# Patient Record
Sex: FEMALE | Race: WHITE | NOT HISPANIC OR LATINO | Employment: UNEMPLOYED | URBAN - METROPOLITAN AREA
[De-identification: names, ages, dates, MRNs, and addresses within clinical notes are randomized per-mention and may not be internally consistent; named-entity substitution may affect disease eponyms.]

---

## 2020-06-09 ENCOUNTER — OFFICE VISIT (OUTPATIENT)
Dept: FAMILY MEDICINE | Facility: HOSPITAL | Age: 40
End: 2020-06-09
Payer: MEDICAID

## 2020-06-09 VITALS
HEART RATE: 55 BPM | BODY MASS INDEX: 26.12 KG/M2 | SYSTOLIC BLOOD PRESSURE: 103 MMHG | HEIGHT: 64 IN | WEIGHT: 153 LBS | DIASTOLIC BLOOD PRESSURE: 67 MMHG

## 2020-06-09 DIAGNOSIS — G56.03 BILATERAL CARPAL TUNNEL SYNDROME: ICD-10-CM

## 2020-06-09 DIAGNOSIS — Z00.00 HEALTHCARE MAINTENANCE: Primary | ICD-10-CM

## 2020-06-09 DIAGNOSIS — B35.3 TINEA PEDIS OF BOTH FEET: ICD-10-CM

## 2020-06-09 PROCEDURE — 99203 OFFICE O/P NEW LOW 30 MIN: CPT | Performed by: STUDENT IN AN ORGANIZED HEALTH CARE EDUCATION/TRAINING PROGRAM

## 2020-06-09 RX ORDER — KETOCONAZOLE 20 MG/G
CREAM TOPICAL DAILY
Qty: 30 G | Refills: 1 | Status: SHIPPED | OUTPATIENT
Start: 2020-06-09

## 2020-06-09 NOTE — PROGRESS NOTES
"Subjective:       Patient ID: Sherrie Mayer is a 39 y.o. female.    Chief Complaint: Annual Exam    HPI     Patient presents to clinic to establish care. Has not seen a PCP in about 5 years.     Patient has had wrist and hand numbness and tingling on her R side that she is concerned may be carpal tunnel. Her work involved repetitive hand movements. She bought a splint from [employer] and wears at night that helps. She has not had as much of an issue since she has stopped working due to covid-19.     The patient has also noticed a new pink mole on her L anterior thigh that has not changed since it has appeared. She also has had foot fungus bilaterally that she has tried treating with tea tree oil without success.     Patient has not had any recent illnesses. Denies tobacco use. Drinks about a 12-pack of beer over a week. She has a history of marijuana use and currently "eats" it. Patient reports that she smoked marijuana from [age] years of age. Patient says that while she smoked marijuana she would frequently get bronchitis, pneumonia, and sinus infections. Since she has stopped smoking she has only had one case of pneumonia in 5 years.     Past Medical History  -none    Surgical History  - pilonidal cyst removal   - wisdom teeth removal    Social History  -works as an artist   -tobacco use: denies  -alcohol use: 1-3 beers a day  -drug use: history of smoking marijuana from [age] years of age. Currently consumes marijuana in an edible form.       Review of Systems   Constitutional: Negative for activity change, appetite change, fatigue, fever and unexpected weight change.   HENT: Negative for congestion, hearing loss, postnasal drip, rhinorrhea, sinus pain and sore throat.    Eyes: Negative for photophobia and visual disturbance.   Respiratory: Negative for apnea, cough, choking, chest tightness, shortness of breath and wheezing.    Cardiovascular: Negative for chest pain, palpitations and leg swelling. "   Gastrointestinal: Negative for abdominal distention, abdominal pain, constipation, diarrhea, nausea and vomiting.   Endocrine: Negative for cold intolerance, heat intolerance and polyuria.   Genitourinary: Negative for difficulty urinating, flank pain, frequency and urgency.   Musculoskeletal: Negative for arthralgias, back pain and myalgias.   Skin: Negative for rash.   Neurological: Positive for numbness. Negative for dizziness, weakness, light-headedness and headaches.        Numbness and tingling of right wrist and hand       Objective:      Vitals:    06/09/20 1343   BP: 103/67   Pulse: (!) 55     Physical Exam   Constitutional: She is oriented to person, place, and time. She appears well-developed and well-nourished. No distress.   HENT:   Head: Normocephalic and atraumatic.   Mouth/Throat: Oropharynx is clear and moist. No oropharyngeal exudate.   Eyes: Pupils are equal, round, and reactive to light. Conjunctivae and EOM are normal. No scleral icterus.   Neck: Normal range of motion. Neck supple. No thyromegaly present.   Cardiovascular: Normal rate, regular rhythm, normal heart sounds and intact distal pulses. Exam reveals no gallop and no friction rub.   No murmur heard.  Pulmonary/Chest: Effort normal and breath sounds normal. No respiratory distress. She has no wheezes. She exhibits no tenderness.   Abdominal: Soft. Bowel sounds are normal. She exhibits no distension and no mass. There is no tenderness. No hernia.   Musculoskeletal: Normal range of motion. She exhibits no edema, tenderness or deformity.   Lymphadenopathy:     She has no cervical adenopathy.   Neurological: She is alert and oriented to person, place, and time. No cranial nerve deficit.   Skin: Skin is warm and dry. Capillary refill takes less than 2 seconds. She is not diaphoretic. No erythema.   Psychiatric: She has a normal mood and affect. Her behavior is normal.   Nursing note and vitals reviewed.      Assessment:     Sherrie Yun  is a 39 y.o. Female presenting to clinic to establish care. Patient's only complaints are wrist numbness and tingling, a new nevus that has appeared within the past 1.5 years, and tinea pedis.   1. Healthcare maintenance    2. Tinea pedis of both feet    3. Bilateral carpal tunnel syndrome        Plan:       Healthcare maintenance  -     CBC auto differential; Future; Expected date: 06/09/2020  -     Basic metabolic panel; Future; Expected date: 06/09/2020  -     Lipid Panel; Future; Expected date: 06/09/2020  - Pap smear completed at this visit as well as STD screening   - Plan to call patient when results are obtained     Tinea pedis of both feet  -     ketoconazole (NIZORAL) 2 % cream; Apply topically once daily.  Dispense: 30 g; Refill: 1  - Apply to affected area daily for 6 weeks   - May need more PO therapy if topicals fail     Bilateral carpal tunnel syndrome  -     Ambulatory referral/consult to Physical/Occupational Therapy; Future; Expected date: 06/16/2020  - Handout on wrist exercises given to patient   - Continue wrist splints   - RTC if symptoms worsen or fail to improve       Follow up in about 3 months (around 9/9/2020), or if symptoms worsen or fail to improve.

## 2020-06-12 ENCOUNTER — CLINICAL SUPPORT (OUTPATIENT)
Dept: REHABILITATION | Facility: HOSPITAL | Age: 40
End: 2020-06-12
Payer: MEDICAID

## 2020-06-12 DIAGNOSIS — M79.641 RIGHT HAND PAIN: ICD-10-CM

## 2020-06-12 DIAGNOSIS — G56.03 BILATERAL CARPAL TUNNEL SYNDROME: ICD-10-CM

## 2020-06-12 PROCEDURE — 97530 THERAPEUTIC ACTIVITIES: CPT | Mod: PO

## 2020-06-12 PROCEDURE — 97166 OT EVAL MOD COMPLEX 45 MIN: CPT | Mod: PO

## 2020-06-12 NOTE — PATIENT INSTRUCTIONS
Extension (Active With Finger Extension)        With forearm on table and wrist over edge, lift hand with fingers straight.   Repeat 20 times. Do 3 sessions per day.    Radial / Ulnar Deviation (Assistive)        Move hand side to side like a windshield wiper. Do not move elbow.  Repeat 20 times. Do 3 sessions per day.    Circumduction (Active)        With fingers curled, move slowly at wrist in clock- wise circles 20 times. Repeat counterclockwise. Do not move elbow or shoulder.  Do 3 sessions per day.      Tendon Glides         Start at position A and move through each position slowly attempting to achieve full glide.  A-E is ONE repetition.     Complete 10 reps 3 times per day.       Heat prior to exercising 10-15 minutes

## 2020-06-12 NOTE — PLAN OF CARE
[facility] Occupational Therapy  Initial Evaluation     Date: 6/12/2020  Patient: Sherrie Mayer  Chart Number: [id number]  Referring Physician: Anita Glez MD  Therapy Diagnosis:   1. Bilateral carpal tunnel syndrome  Ambulatory referral/consult to Physical/Occupational Therapy   2. 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 Pt denies any involvement in the LUE. Pt has been wearing a cockup splint at night and while typing w/ some relief of pain. Overall symptoms have improved since pt has not been working.  Imaging: N/A  Previous Therapy: None    Patient's Goals for Therapy: Normalize right hand function.    Pain:  Functional Pain Scale Rating 0-10:   2/10 on average  2/10 at best  6/10 at worst  Location: right wrist  Description: Aching and Throbbing  Aggravating Factors: moving the wrist  Easing Factors: relaxation    Previous Level of function Independent w/ ADL's, work    Current Level of Function Difficulty w/ typing, opening containers    Occupation:  Seamstress, crafts  Working presently: unemployed  Duties: Creating costumes, decorating movie sets, floats    Past Medical History/Physical Systems Review:   Sherrie Mayer  has no past medical history on file.    Sherrie Mayer  has no past surgical history on file.    Sherrie has a current medication list which includes the  following prescription(s): ketoconazole.    Review of patient's allergies indicates:   Allergen Reactions    Hydrocodone Nausea And Vomiting          Objective     Mental status: alert    Observation:   Slight intrinsic minus     Sensation: Ulnar Nerve  Distribution   2020    Left   Monofilament Testing    Normal 1.65-2.83 x   Diminished Light Touch 3.22-3.61    Diminished Protective 3.84-4.31    Loss of Protective 4.56-6.65    Untestable >6.65      Range of Motion:   Right  WNL all joint planes    Special Tests:   Right   2020   Thumb CMC Grind Test    Finkelstein's Test    Phalen's Test -   Tinel's Test at volar wrist, cubital tunnel -   Anil's Test    Extrinsic Tightness Test    Intrinsic Tightness Test    Oquendo's Shift     ORL Test    Froment's Sign    Analy's Sign     Piano Key Test     ECU Synergy     TFCC Load      Manual Muscle Testin/5 intrinsics     Strength: (SILVINA Dynamometer in psi.)      2020    Left Right   Rung II 55 56       Pinch Strength (Measured in psi)     2020    Left Right   Key Pinch 11  15    3pt Pinch 13  10        Treatment     Treatment Time In: 4:00 PM  Treatment Time Out: 4:15 PM  Total Treatment time separate from Evaluation time:15 min    Sherrie participated in dynamic functional therapeutic activities to improve functional performance for 15  minutes, including:  -AROM WE/WF/UD/RD, circles  -TGE's    Home Exercise Program/Education:  Issued HEP (see patient instructions in EMR) and educated on modality use for pain management . Exercises were reviewed and Sherrie was able to demonstrate them prior to the end of the session.   Pt received a written copy of exercises to perform at home. Sherrie demonstrated good  understanding of the education provided.  Pt was advised to perform these exercises free of pain, and to stop performing them if pain occurs.    Patient/Family Education: role of OT, goals for OT, scheduling/cancellations - pt  verbalized understanding. Discussed insurance limitations with patient.    Additional Education provided: Pathophysiology      Assessment     Sherrie Mayer is a 39 y.o. female presents with limitations as described in problem list. Patient can benefit from Occupational Therapy services for Iontophoresis, ultrasound, moist heat, therapeutic exercises, home exercise program provied with written instructions, ice and strengthening and orthotics, if deemed necessary . The following goals were discussed with the patient and she is in agreement with them as to be addressed in the treatment plan.    The patient's rehab potential is Good.     Anticipated barriers to occupational therapy: None  Pt has no cultural, educational or language barriers to learning provided.    Profile and History Assessment of Occupational Performance Level of Clinical Decision Making Complexity Score   Occupational Profile:   Sherrie Mayer is a 39 y.o. female who is not employed Sherrie Mayer has difficulty with  ADLs and IADLs as listed previously, which  affecting his/her daily functional abilities.      Comorbidities:    has no past medical history on file.    Medical and Therapy History Review:   Expanded               Performance Deficits    Physical:   Strength  Pinch Strength    Cognitive:  No Deficits    Psychosocial:    No Deficits     Clinical Decision Making:  moderate    Assessment Process:  Problem-Focused Assessments    Modification/Need for Assistance:  Not Necessary    Intervention Selection:  Several Treatment Options       moderate  Based on PMHX, co morbidities , data from assessments and functional level of assistance required with task and clinical presentation directly impacting function.         Goals:    LTG's (12 weeks):  1)   Pt will report significant decrease in numbness of the right hand.  2)   Decrease complaints of pain to  2 out of 10 at worst to increase functional hand use for ADL/work/leisure  activities.  3)   Pt will return to near to prior level of function for ADLs and household management reporting I or Mod I with ADLs (dressing, feeding, grooming, toileting).     STG's (6 weeks)  1)   Patient to be IND with HEP and modalities for pain/edema managment.  2)   Patient to be IND wiht Orthotic use, wear and care precautions.   3)   Decrease complaints of pain to  4 out of 10 at worst to increase functional hand use for ADL/work/leisure activities.          Plan     Pt to be treated by Occupational Therapy 2 times per week for 12 weeks during the certification period from 6/12/2020 to 9/12/2020 to achieve the established goals.     Treatment to include: Paraffin, Fluidotherapy, Manual therapy/joint mobilizations, US 3 mhz and Therapeutic exercises/activities., as well as any other treatments deemed necessary based on the patient's needs or progress.     JALYN Bragg OTR/L, CHT  Occupational therapist, Certified Hand Therapist

## 2020-06-16 NOTE — PROGRESS NOTES
I assume primary medical responsibility for this patient. I have reviewed the history, physical, and assessement & treatment plan with the resident and agree that the care is reasonable and necessary. This service has been performed by a resident without the presence of a teaching physician under the primary care exception. If necessary, an addendum of additional findings or evaluation beyond the resident documentation will be noted below.    Magali Cifuentes MD

## 2020-06-19 ENCOUNTER — CLINICAL SUPPORT (OUTPATIENT)
Dept: REHABILITATION | Facility: HOSPITAL | Age: 40
End: 2020-06-19
Payer: MEDICAID

## 2020-06-19 DIAGNOSIS — M79.641 RIGHT HAND PAIN: ICD-10-CM

## 2020-06-19 PROCEDURE — 97022 WHIRLPOOL THERAPY: CPT | Mod: PO

## 2020-06-19 PROCEDURE — 97110 THERAPEUTIC EXERCISES: CPT | Mod: PO

## 2020-06-19 NOTE — PROGRESS NOTES
Occupational Therapy Daily Treatment Note     Date: 6/19/2020  Name: Sherrie Mayer  Clinic Number: [id number]    Therapy Diagnosis:   Encounter Diagnosis   Name Primary?    Right hand pain      Physician: Anita Glez MD    Medical Diagnosis: Bilateral CTS  Physician Orders: Eval/tx  Evaluation Date: 6/12/2020  Plan of Care Certification Date: 9/11/2020  Authorization Period: 12/31/2020  Surgery Date and Procedure: N/A  Date of Return to MD: SHARONA     Visit #: 2 of 20  Time In: 3:30 PM  Time Out: 4:15 PM  Total Billable Time: 25 min     Precautions: Standard    Subjective     Pt reports: Feeling better  Response to previous treatment:John well    Pain: 2/10  Location: Right wrist    Objective     Sherrie received the following supervised modalities after being cleared for contradictions for 15 minutes:   -Fluidotherapy    Sherrie performed therapeutic exercises for 25 minutes including:  -AROM WE/WF/UD/RD, circles 20  -Isospheres 3'  -Juxacisor 3'  -Coins 1 container    Home Exercises and Education Provided     Education provided:   - Progress towards goals     Written Home Exercises Provided: Patient instructed to cont prior HEP.  Exercises were reviewed and Sherrie was able to demonstrate them prior to the end of the session.  Sherrie demonstrated good  understanding of the HEP provided.   .   See EMR under Patient Instructions for exercises provided prior visit.        Assessment     Pt would continue to benefit from skilled OT to maximize RUE function.     Sherrie is progressing well towards her goals and there are no updates to goals at this time. Pt prognosis is Good.     Pt will continue to benefit from skilled outpatient occupational therapy to address the deficits listed in the problem list on initial evaluation provide pt/family education and to maximize pt's level of independence in the home and community environment.       Pt's spiritual, cultural and educational needs considered and pt agreeable to plan of  care and goals.    Goals:  LTG's (12 weeks):  1)   Pt will report significant decrease in numbness of the right hand.  2)   Decrease complaints of pain to  2 out of 10 at worst to increase functional hand use for ADL/work/leisure activities.  3)   Pt will return to near to prior level of function for ADLs and household management reporting I or Mod I with ADLs (dressing, feeding, grooming, toileting).      STG's (6 weeks)  1)   Patient to be IND with HEP and modalities for pain/edema managment.  2)   Patient to be IND wiht Orthotic use, wear and care precautions.   3)   Decrease complaints of pain to  4 out of 10 at worst to increase functional hand use for ADL/work/leisure activities.    Plan   Cont OT to address above goals.        JALYN Bragg OTR/L, CHT

## 2020-06-22 ENCOUNTER — CLINICAL SUPPORT (OUTPATIENT)
Dept: REHABILITATION | Facility: HOSPITAL | Age: 40
End: 2020-06-22
Payer: MEDICAID

## 2020-06-22 DIAGNOSIS — M79.641 RIGHT HAND PAIN: ICD-10-CM

## 2020-06-22 PROCEDURE — 97022 WHIRLPOOL THERAPY: CPT | Mod: PO

## 2020-06-22 PROCEDURE — 97530 THERAPEUTIC ACTIVITIES: CPT | Mod: PO

## 2020-06-22 NOTE — PROGRESS NOTES
Occupational Therapy Daily Treatment Note     Date: 6/22/2020  Name: Sherrie Mayer  Clinic Number: [id number]    Therapy Diagnosis:   Encounter Diagnosis   Name Primary?    Right hand pain      Physician: Anita Glez MD  Medical Diagnosis: Bilateral CTS  Physician Orders: Eval/tx  Evaluation Date: 6/12/2020  Plan of Care Certification Date: 9/11/2020  Authorization Period: 12/31/2020  Surgery Date and Procedure: N/A  Date of Return to MD: SHARONA     Visit #: 3 of 20  Time In: 10:30 AM  Time Out: 11:15 AM  Total Billable Time: 25 min     Precautions: Standard    Subjective     Pt reports: No new c/o, incidents of numbness decreasing in frequency and intensity  Response to previous treatment:John well, no issues w/ HEP    Pain: 0/10    Objective   Sherrie received the following supervised modalities after being cleared for contradictions for 15 minutes:   -Fluidotherapy     Sherrie performed therapeutic activities for 25 minutes including:  -AROM WE/WF/UD/RD, circles 20  -Isospheres 3'  -Juxacisor 3'  -Coins 1 container  -Rice bucket 3'  -Small pom poms 2 containers blue CP  Home Exercises and Education Provided     Education provided:   - Progress towards goals     Written Home Exercises Provided: Patient instructed to cont prior HEP.  Exercises were reviewed and Sherrie was able to demonstrate them prior to the end of the session.  Sherrie demonstrated good  understanding of the HEP provided.   .   See EMR under Patient Instructions for exercises provided prior visit.        Assessment     Pt would continue to benefit from skilled OT to maximize RUE function.     Sherrie is progressing well towards her goals and there are no updates to goals at this time. Pt prognosis is Good.     Pt will continue to benefit from skilled outpatient occupational therapy to address the deficits listed in the problem list on initial evaluation provide pt/family education and to maximize pt's level of independence in the home and community  environment.       Pt's spiritual, cultural and educational needs considered and pt agreeable to plan of care and goals.    Goals:  LTG's (12 weeks):  1)   Pt will report significant decrease in numbness of the right hand. Progressing  2)   Decrease complaints of pain to  2 out of 10 at worst to increase functional hand use for ADL/work/leisure activities. Progressing  3)   Pt will return to near to prior level of function for ADLs and household management reporting I or Mod I with ADLs (dressing, feeding, grooming, toileting). Progressing     STG's (6 weeks)  1)   Patient to be IND with HEP and modalities for pain/edema managment. Progressing  2)   Patient to be IND wiht Orthotic use, wear and care precautions. Progressing  3)   Decrease complaints of pain to  4 out of 10 at worst to increase functional hand use for ADL/work/leisure activities. Progressing    Plan   Cont OT to address above goals.        JALYN Bragg OTR/L, CHT

## 2020-06-24 ENCOUNTER — CLINICAL SUPPORT (OUTPATIENT)
Dept: REHABILITATION | Facility: HOSPITAL | Age: 40
End: 2020-06-24
Payer: MEDICAID

## 2020-06-24 DIAGNOSIS — M79.641 RIGHT HAND PAIN: ICD-10-CM

## 2020-06-24 PROCEDURE — 97022 WHIRLPOOL THERAPY: CPT | Mod: PO

## 2020-06-24 PROCEDURE — 97110 THERAPEUTIC EXERCISES: CPT | Mod: PO

## 2020-06-24 NOTE — PROGRESS NOTES
Occupational Therapy Daily Treatment Note     Date: 6/24/2020  Name: Sherrie Mayer  Clinic Number: [id number]    Therapy Diagnosis:   Encounter Diagnosis   Name Primary?    Right hand pain      Physician: Anita Glez MD    Medical Diagnosis: Bilateral CTS  Physician Orders: Eval/tx  Evaluation Date: 6/12/2020  Plan of Care Certification Date: 9/11/2020  Authorization Period: 12/31/2020  Surgery Date and Procedure: N/A  Date of Return to MD: SHARONA     Visit #: 4 of 20  Time In: 2:15 PM  Time Out: 3:00 PM  Total Billable Time: 30 min     Precautions: Standard    Subjective     Pt reports: Her hand is not bothering her as much as it has  Response to previous treatment:John well    Pain: 0/10    Objective   Sherrie received the following supervised modalities after being cleared for contradictions for 15 minutes:   -Fluidotherapy     Sherrie performed therapeutic activities for 25 minutes including:  -AROM WE/WF/UD/RD, circles 20  -Isospheres 3'  -Juxacisor 3'  -Coins 1 container  -Rice bucket 3'  -Small pom poms 2 containers blue CP       Strength: (SILVINA Dynamometer in psi.)        6/12/2020 6/12/2020     Left Right   Rung II 64 +9          Home Exercises and Education Provided     Education provided:   - Progress towards goals     Written Home Exercises Provided: Patient instructed to cont prior HEP.  Exercises were reviewed and Sherrie was able to demonstrate them prior to the end of the session.  Sherrie demonstrated good  understanding of the HEP provided.   .   See EMR under Patient Instructions for exercises provided prior visit.        Assessment     Pt would continue to benefit from skilled OT to maximize RUE function.     Sherrie is progressing well towards her goals and there are no updates to goals at this time. Pt prognosis is Good.     Pt will continue to benefit from skilled outpatient occupational therapy to address the deficits listed in the problem list on initial evaluation provide pt/family  education and to maximize pt's level of independence in the home and community environment.       Pt's spiritual, cultural and educational needs considered and pt agreeable to plan of care and goals.    Goals:  LTG's (12 weeks):  1)   Pt will report significant decrease in numbness of the right hand. Progressing  2)   Decrease complaints of pain to  2 out of 10 at worst to increase functional hand use for ADL/work/leisure activities. Progressing  3)   Pt will return to near to prior level of function for ADLs and household management reporting I or Mod I with ADLs (dressing, feeding, grooming, toileting). Progressing     STG's (6 weeks)  1)   Patient to be IND with HEP and modalities for pain/edema managment. Progressing  2)   Patient to be IND wiht Orthotic use, wear and care precautions. Progressing  3)   Decrease complaints of pain to  4 out of 10 at worst to increase functional hand use for ADL/work/leisure activities. Progressing    Plan   Cont OT to address above goals.        JALYN Bragg OTR/L, CHT

## 2020-06-25 ENCOUNTER — TELEPHONE (OUTPATIENT)
Dept: FAMILY MEDICINE | Facility: HOSPITAL | Age: 40
End: 2020-06-25

## 2020-06-25 NOTE — TELEPHONE ENCOUNTER
----- Message from Elvira Cheney, Patient Care Assistant sent at 6/25/2020 11:13 AM CDT -----  Regarding: reccommendation for a good dentist  Pt is asking if you can recommend a good dentist for her to go to that excepts her insurance. The Dr she was going to she was unhappy with their facility and wants to go to some one else before she goes out of town.      Contact pt  lol [phone]

## 2020-06-30 ENCOUNTER — CLINICAL SUPPORT (OUTPATIENT)
Dept: REHABILITATION | Facility: HOSPITAL | Age: 40
End: 2020-06-30
Payer: MEDICAID

## 2020-06-30 DIAGNOSIS — M79.641 RIGHT HAND PAIN: ICD-10-CM

## 2020-06-30 PROCEDURE — 97022 WHIRLPOOL THERAPY: CPT | Mod: PO

## 2020-06-30 PROCEDURE — 97110 THERAPEUTIC EXERCISES: CPT | Mod: PO

## 2020-06-30 NOTE — PROGRESS NOTES
Occupational Therapy Daily Treatment Note     Date: 6/30/2020  Name: Sherrie Mayer  Clinic Number: [id number]    Therapy Diagnosis:   Encounter Diagnosis   Name Primary?    Right hand pain      Physician: Anita Glez MD    Medical Diagnosis: Bilateral CTS  Physician Orders: Eval/tx  Evaluation Date: 6/12/2020  Plan of Care Certification Date: 9/11/2020  Authorization Period: 12/31/2020  Surgery Date and Procedure: N/A  Date of Return to MD: SHARONA     Visit #: 6 of 20  Time In: 10:45 AM  Time Out: 11:30 AM  Total Billable Time: 30 min     Precautions: Standard    Subjective     Pt reports: No new c/o  Response to previous treatment:John well    Pain: 0/10    Objective   Sherrie received the following supervised modalities after being cleared for contradictions for 15 minutes:   -Fluidotherapy     Sherrie performed therapeutic activities for 25 minutes including:  -AROM WE/WF/UD/RD, circles 20  -Isospheres 3'  -Juxacisor 3'  -Coins 1 container  -Rice bucket 3'  -Small pom poms 2 containers blue CP  -Small red flexbar frowns/smiles 3x10    Home Exercises and Education Provided     Education provided:   - Progress towards goals     Written Home Exercises Provided: Patient instructed to cont prior HEP.  Exercises were reviewed and Sherrie was able to demonstrate them prior to the end of the session.  Sherrie demonstrated good  understanding of the HEP provided.   .   See EMR under Patient Instructions for exercises provided prior visit.        Assessment     Pt would continue to benefit from skilled OT to maximize RUE function.     Sherrie is progressing well towards her goals and there are no updates to goals at this time. Pt prognosis is Good.     Pt will continue to benefit from skilled outpatient occupational therapy to address the deficits listed in the problem list on initial evaluation provide pt/family education and to maximize pt's level of independence in the home and community environment.       Pt's spiritual,  cultural and educational needs considered and pt agreeable to plan of care and goals.    Goals:  LTG's (12 weeks):  1)   Pt will report significant decrease in numbness of the right hand. Progressing  2)   Decrease complaints of pain to  2 out of 10 at worst to increase functional hand use for ADL/work/leisure activities. Progressing  3)   Pt will return to near to prior level of function for ADLs and household management reporting I or Mod I with ADLs (dressing, feeding, grooming, toileting). Progressing     STG's (6 weeks)  1)   Patient to be IND with HEP and modalities for pain/edema managment. Progressing  2)   Patient to be IND wiht Orthotic use, wear and care precautions. Progressing  3)   Decrease complaints of pain to  4 out of 10 at worst to increase functional hand use for ADL/work/leisure activities. Progressing    Plan   Cont OT to address above goals.        JALYN Bragg OTR/L, CHT

## 2020-07-10 ENCOUNTER — CLINICAL SUPPORT (OUTPATIENT)
Dept: REHABILITATION | Facility: HOSPITAL | Age: 40
End: 2020-07-10
Payer: MEDICAID

## 2020-07-10 ENCOUNTER — OFFICE VISIT (OUTPATIENT)
Dept: FAMILY MEDICINE | Facility: HOSPITAL | Age: 40
End: 2020-07-10
Payer: MEDICAID

## 2020-07-10 ENCOUNTER — DOCUMENTATION ONLY (OUTPATIENT)
Dept: REHABILITATION | Facility: HOSPITAL | Age: 40
End: 2020-07-10

## 2020-07-10 VITALS
SYSTOLIC BLOOD PRESSURE: 104 MMHG | DIASTOLIC BLOOD PRESSURE: 72 MMHG | HEART RATE: 69 BPM | WEIGHT: 150.38 LBS | HEIGHT: 64 IN | BODY MASS INDEX: 25.67 KG/M2

## 2020-07-10 DIAGNOSIS — Z71.2 ENCOUNTER TO DISCUSS TEST RESULTS: ICD-10-CM

## 2020-07-10 DIAGNOSIS — Q82.5 NEVUS SIMPLEX: Primary | ICD-10-CM

## 2020-07-10 DIAGNOSIS — B35.3 TINEA PEDIS OF BOTH FEET: ICD-10-CM

## 2020-07-10 DIAGNOSIS — M79.641 RIGHT HAND PAIN: ICD-10-CM

## 2020-07-10 PROCEDURE — 99213 OFFICE O/P EST LOW 20 MIN: CPT | Performed by: STUDENT IN AN ORGANIZED HEALTH CARE EDUCATION/TRAINING PROGRAM

## 2020-07-10 PROCEDURE — 97530 THERAPEUTIC ACTIVITIES: CPT | Mod: PO

## 2020-07-10 NOTE — PROGRESS NOTES
Subjective:       Patient ID: Sherrie Mayer is a 39 y.o. female.    Chief Complaint: Follow up mole   Patient is a 40 yo woman with no significant pmhx presents to clinic for follow up mole. She reports no changes, but interested in having it removed. After looking at mole it appears benign and decision was made to continue to watch it. Her wrist pain improved.    Went over PAP and STI screening. She has NILM and HPV positive pap. She will need repeat pap in 1 year (2021). Otherwise ROS negative.       Review of Systems   Constitutional: Negative for activity change, appetite change, fatigue and unexpected weight change.   HENT: Negative for congestion, hearing loss, postnasal drip, rhinorrhea and sinus pain.    Eyes: Negative for photophobia and visual disturbance.   Respiratory: Negative for apnea, cough, choking, chest tightness, shortness of breath and wheezing.    Cardiovascular: Negative for chest pain, palpitations and leg swelling.   Gastrointestinal: Negative for abdominal distention, abdominal pain, constipation, diarrhea, nausea and vomiting.   Endocrine: Negative for cold intolerance, heat intolerance and polyuria.   Genitourinary: Negative for difficulty urinating, flank pain, frequency and urgency.   Musculoskeletal: Negative for arthralgias, back pain and myalgias.   Skin: Negative for rash.   Neurological: Negative for dizziness, weakness, light-headedness, numbness and headaches.       Objective:      Vitals:    07/10/20 0923   BP: 104/72   Pulse: 69     Physical Exam  Vitals signs and nursing note reviewed.   Constitutional:       General: She is not in acute distress.     Appearance: She is well-developed. She is not diaphoretic.   HENT:      Head: Normocephalic and atraumatic.      Mouth/Throat:      Pharynx: No oropharyngeal exudate.   Eyes:      General: No scleral icterus.     Conjunctiva/sclera: Conjunctivae normal.      Pupils: Pupils are equal, round, and reactive to light.   Neck:       Musculoskeletal: Normal range of motion and neck supple.      Thyroid: No thyromegaly.   Cardiovascular:      Rate and Rhythm: Normal rate and regular rhythm.      Heart sounds: Normal heart sounds. No murmur. No friction rub. No gallop.    Pulmonary:      Effort: Pulmonary effort is normal. No respiratory distress.      Breath sounds: Normal breath sounds. No wheezing.   Chest:      Chest wall: No tenderness.   Abdominal:      General: Bowel sounds are normal. There is no distension.      Palpations: Abdomen is soft. There is no mass.      Tenderness: There is no abdominal tenderness.      Hernia: No hernia is present.   Musculoskeletal: Normal range of motion.         General: No tenderness or deformity.   Lymphadenopathy:      Cervical: No cervical adenopathy.   Skin:     General: Skin is warm and dry.      Capillary Refill: Capillary refill takes less than 2 seconds.      Findings: No erythema.   Neurological:      Mental Status: She is alert and oriented to person, place, and time.      Cranial Nerves: No cranial nerve deficit.   Psychiatric:         Behavior: Behavior normal.             Assessment:       1. Nevus simplex    2. Tinea pedis of both feet    3. Encounter to discuss test results        Plan:       Nevus simplex  - Will continue to monitor   - Benign appearing   Tinea pedis of both feet  - Improved with current therapy of topicals  -Continue current therapy  -If not resolved will consider oral therapy at next visit    Encounter to discuss test results  - Discussed Pap results  - NILM, HPV positive  - Repeat pap due in 2021     FU in 6 months or PRN

## 2020-07-10 NOTE — PROGRESS NOTES
Occupational Therapy Daily Treatment Note     Date: 7/10/2020  Name: Sherrie Mayer  Clinic Number: [id number]    Therapy Diagnosis:   Encounter Diagnosis   Name Primary?    Right hand pain      Physician: Anita Glez MD    Medical Diagnosis: Bilateral CTS  Physician Orders: Eval/tx  Evaluation Date: 6/12/2020  Plan of Care Certification Date: 9/11/2020  Authorization Period: 12/31/2020  Surgery Date and Procedure: N/A  Date of Return to MD: TBE     Visit #: 6 of 20  Time In:  3 PM  Time Out: 3:45 PM  Total Billable Time: 45  min     Precautions: Standard    Subjective     Pt reports: Increased numbness with play video games  Response to previous treatment:John well    Pain: 0/10    Objective   Sherrie received the following supervised modalities after being cleared for contradictions for 15 minutes:   -Fluidotherapy     Sherrie performed therapeutic activities for 25 minutes including:  -AROM WE/WF/UD/RD, circles 20  -Isospheres 3'  -Juxacisor 3'  -Coins 1 container  - ulnar nerve glides  - Patient performed CHG @ level 3  x 30 reps to  Increase  strengthening.     Home Exercises and Education Provided     Education provided:   - Progress towards goals     Written Home Exercises Provided: yes. For ulnar nerve glides and prevention   Exercises were reviewed and Sherrie was able to demonstrate them prior to the end of the session.  Sherrie demonstrated good  understanding of the HEP provided.   .   See EMR under Patient Instructions for exercises provided 7/10/2020.        Assessment     Pt would continue to benefit from skilled OT to maximize RUE function. Pt supplied nerve glides and prevention.     Sherrie is progressing well towards her goals and there are no updates to goals at this time. Pt prognosis is Good.     Pt will continue to benefit from skilled outpatient occupational therapy to address the deficits listed in the problem list on initial evaluation provide pt/family education and to maximize pt's  level of independence in the home and community environment.       Pt's spiritual, cultural and educational needs considered and pt agreeable to plan of care and goals.    Goals:  LTG's (12 weeks):  1)   Pt will report significant decrease in numbness of the right hand. Progressing  2)   Decrease complaints of pain to  2 out of 10 at worst to increase functional hand use for ADL/work/leisure activities. Progressing  3)   Pt will return to near to prior level of function for ADLs and household management reporting I or Mod I with ADLs (dressing, feeding, grooming, toileting). Progressing     STG's (6 weeks)  1)   Patient to be IND with HEP and modalities for pain/edema managment. Progressing  2)   Patient to be IND wiht Orthotic use, wear and care precautions. Progressing  3)   Decrease complaints of pain to  4 out of 10 at worst to increase functional hand use for ADL/work/leisure activities. Progressing    Plan   Cont OT to address above goals.

## 2020-07-10 NOTE — PATIENT INSTRUCTIONS
[facility]  ULNAR NERVE GLIDING (Series 2)    ComplPerform 12 times 3 times daily in a pain-free range                                      1. Extend your arm in front of you keeping your elbow straight, and bend the wrist and fingers.  2. Extend your wrist and fingers.  3. Bend your elbow.  4. Bring your arm out to the side, bend your wrist and fingers.  5. Rotate your arm so that your palm is facing behind you.  6. Tilt your head to the opposite side.        If any oIf these exercises aggravate your hands, stop, rest and try returning again to the previouexercise performed without pain. Proceed at your own pace using pain tolerance            ~ FOR NIGHT USE TO AVOID BENDING THE ELBOW  ~TRY AVOID SUSTAIN BENDING OF THE ELBOW  ~ IF YOU CANNOT USE THE TOWEL ROLL, TRY HUGGING A BODY PILLOW        What Is Cubital Tunnel Syndrome?  Cubital tunnel syndrome is a set of symptoms that may occur if the ulnar nerve in your elbow gets pinched. This may happen if you bend or lean on your elbows often.    Your cubital tunnel  The cubital tunnel is a groove in a bone near your elbow. This narrow groove provides a passage for the ulnar nerve, one of the main nerves in your arm. The ulnar nerve can cause funny bone pain if your elbow gets bumped. Your cubital tunnel helps protect this nerve as it passes through your elbow and down to your fingers.  Compressing the ulnar nerve  Bending your elbow compresses the ulnar nerve inside the cubital tunnel. The nerve can get inflamed (irritated) after constant bending and pinching or after getting hurt. Over time, this can lead to pain or numbness. The pain is often felt in your ring fingers and little fingers.     Bending your elbow as you hold a phone can cause problems over time.    What are its symptoms?  · Numbness or tingling in the ring fingers and little fingers  · Loss of finger or hand strength  · Inability to straighten fingers  · Sharp, sudden pain when elbow is  touched

## 2020-07-16 ENCOUNTER — CLINICAL SUPPORT (OUTPATIENT)
Dept: REHABILITATION | Facility: HOSPITAL | Age: 40
End: 2020-07-16
Payer: MEDICAID

## 2020-07-16 DIAGNOSIS — M79.641 RIGHT HAND PAIN: ICD-10-CM

## 2020-07-16 PROCEDURE — 97022 WHIRLPOOL THERAPY: CPT | Mod: PO

## 2020-07-16 PROCEDURE — 97110 THERAPEUTIC EXERCISES: CPT | Mod: PO

## 2020-07-16 NOTE — PROGRESS NOTES
Occupational Therapy Daily Treatment Note/Discharge Summary     Date: 7/16/2020  Name: Sherrie Mayer  Clinic Number: [id number]    Therapy Diagnosis:   Encounter Diagnosis   Name Primary?    Right hand pain      Physician: Anita Glez MD    Medical Diagnosis: Bilateral CTS  Physician Orders: Eval/tx  Evaluation Date: 6/12/2020  Plan of Care Certification Date: 9/11/2020  Authorization Period: 12/31/2020  Surgery Date and Procedure: N/A  Date of Return to MD: SHARONA     Visit #: 7 of 20  Time In:  10:15 AM  Time Out: 11:00 AM  Total Billable Time: 30  min     Precautions: Standard    Subjective     Pt reports: symptoms resolved  Response to previous treatment:John well    Pain: 0/10    Objective   Sherrie received the following supervised modalities after being cleared for contradictions for 15 minutes:   -Fluidotherapy     Sherrie performed therapeutic activities for 25 minutes including:  -AROM WE/WF/UD/RD, circles 20  -Isospheres 3'  -Juxacisor 3'  -Coins 1 container  - ulnar nerve glides  - Patient performed CHG @ level 3  x 30 reps to  Increase  strengthening.     Home Exercises and Education Provided     Education provided:   - Progress towards goals     Written Home Exercises Provided: Patient instructed to cont prior HEP.  Exercises were reviewed and Sherrie was able to demonstrate them prior to the end of the session.  Sherrie demonstrated good  understanding of the HEP provided.   .   See EMR under Patient Instructions for exercises provided prior visit.        Assessment     [place] so she will be discharged.    Pt's spiritual, cultural and educational needs considered and pt agreeable to plan of care and goals.    Goals:  LTG's (12 weeks):  1)   Pt will report significant decrease in numbness of the right hand. Met  2)   Decrease complaints of pain to  2 out of 10 at worst to increase functional hand use for ADL/work/leisure activities. Met  3)   Pt will return to near to  prior level of function for ADLs and household management reporting I or Mod I with ADLs (dressing, feeding, grooming, toileting). Met     STG's (6 weeks)  1)   Patient to be IND with HEP and modalities for pain/edema managment. Met  2)   Patient to be IND wiht Orthotic use, wear and care precautions. Met  3)   Decrease complaints of pain to  4 out of 10 at worst to increase functional hand use for ADL/work/leisure activities. Met    Plan   D/C from ANGY Bragg OTR/L, CHT